# Patient Record
Sex: MALE | Race: WHITE | Employment: PART TIME | URBAN - METROPOLITAN AREA
[De-identification: names, ages, dates, MRNs, and addresses within clinical notes are randomized per-mention and may not be internally consistent; named-entity substitution may affect disease eponyms.]

---

## 2019-07-01 ENCOUNTER — HOSPITAL ENCOUNTER (OUTPATIENT)
Dept: PREADMISSION TESTING | Age: 79
Discharge: HOME | End: 2019-07-01
Payer: MEDICARE

## 2019-07-01 DIAGNOSIS — M17.11 OSTEOARTHRITIS OF RIGHT KNEE: ICD-10-CM

## 2019-07-01 DIAGNOSIS — Z01.818 OTHER SPECIFIED PRE-OPERATIVE EXAMINATION: ICD-10-CM

## 2019-07-01 LAB
ALBUMIN SERPL-MCNC: 3.5 G/DL (ref 3.4–5)
ALBUMIN/GLOB SERPL: 1 {RATIO} (ref 0.8–1.7)
ALP SERPL-CCNC: 71 U/L (ref 45–117)
ALT SERPL-CCNC: 22 U/L (ref 16–61)
ANION GAP SERPL CALC-SCNC: 4 MMOL/L (ref 3–18)
APPEARANCE UR: CLEAR
APTT PPP: 32.6 SEC (ref 23–36.4)
AST SERPL-CCNC: 24 U/L (ref 15–37)
ATRIAL RATE: 67 BPM
BACTERIA SPEC CULT: NORMAL
BASOPHILS # BLD: 0 K/UL (ref 0–0.1)
BASOPHILS NFR BLD: 0 % (ref 0–2)
BILIRUB SERPL-MCNC: 0.6 MG/DL (ref 0.2–1)
BILIRUB UR QL: NEGATIVE
BUN SERPL-MCNC: 26 MG/DL (ref 7–18)
BUN/CREAT SERPL: 22 (ref 12–20)
CALCIUM SERPL-MCNC: 9.1 MG/DL (ref 8.5–10.1)
CALCULATED R AXIS, ECG10: -62 DEGREES
CALCULATED T AXIS, ECG11: -13 DEGREES
CHLORIDE SERPL-SCNC: 106 MMOL/L (ref 100–108)
CO2 SERPL-SCNC: 30 MMOL/L (ref 21–32)
COLOR UR: YELLOW
CREAT SERPL-MCNC: 1.17 MG/DL (ref 0.6–1.3)
CRP SERPL-MCNC: 0.6 MG/DL (ref 0–0.3)
DIAGNOSIS, 93000: NORMAL
DIFFERENTIAL METHOD BLD: ABNORMAL
EOSINOPHIL # BLD: 0 K/UL (ref 0–0.4)
EOSINOPHIL NFR BLD: 1 % (ref 0–5)
ERYTHROCYTE [DISTWIDTH] IN BLOOD BY AUTOMATED COUNT: 12.8 % (ref 11.6–14.5)
ERYTHROCYTE [SEDIMENTATION RATE] IN BLOOD: 23 MM/HR (ref 0–20)
EST. AVERAGE GLUCOSE BLD GHB EST-MCNC: 134 MG/DL
GLOBULIN SER CALC-MCNC: 3.6 G/DL (ref 2–4)
GLUCOSE SERPL-MCNC: 77 MG/DL (ref 74–99)
GLUCOSE UR STRIP.AUTO-MCNC: NEGATIVE MG/DL
HBA1C MFR BLD: 6.3 % (ref 4.2–5.6)
HCT VFR BLD AUTO: 42.5 % (ref 36–48)
HGB BLD-MCNC: 14.5 G/DL (ref 13–16)
HGB UR QL STRIP: NEGATIVE
INR PPP: 2 (ref 0.8–1.2)
KETONES UR QL STRIP.AUTO: NEGATIVE MG/DL
LEUKOCYTE ESTERASE UR QL STRIP.AUTO: NEGATIVE
LYMPHOCYTES # BLD: 1.6 K/UL (ref 0.9–3.6)
LYMPHOCYTES NFR BLD: 26 % (ref 21–52)
MCH RBC QN AUTO: 32.2 PG (ref 24–34)
MCHC RBC AUTO-ENTMCNC: 34.1 G/DL (ref 31–37)
MCV RBC AUTO: 94.4 FL (ref 74–97)
MONOCYTES # BLD: 0.7 K/UL (ref 0.05–1.2)
MONOCYTES NFR BLD: 11 % (ref 3–10)
NEUTS SEG # BLD: 3.8 K/UL (ref 1.8–8)
NEUTS SEG NFR BLD: 62 % (ref 40–73)
NITRITE UR QL STRIP.AUTO: NEGATIVE
PH UR STRIP: 6.5 [PH] (ref 5–8)
PLATELET # BLD AUTO: 235 K/UL (ref 135–420)
PMV BLD AUTO: 9.3 FL (ref 9.2–11.8)
POTASSIUM SERPL-SCNC: 4.3 MMOL/L (ref 3.5–5.5)
PROT SERPL-MCNC: 7.1 G/DL (ref 6.4–8.2)
PROT UR STRIP-MCNC: NEGATIVE MG/DL
PROTHROMBIN TIME: 22.3 SEC (ref 11.5–15.2)
Q-T INTERVAL, ECG07: 396 MS
QRS DURATION, ECG06: 92 MS
QTC CALCULATION (BEZET), ECG08: 411 MS
RBC # BLD AUTO: 4.5 M/UL (ref 4.7–5.5)
SERVICE CMNT-IMP: NORMAL
SODIUM SERPL-SCNC: 140 MMOL/L (ref 136–145)
SP GR UR REFRACTOMETRY: 1.02 (ref 1–1.03)
UROBILINOGEN UR QL STRIP.AUTO: 1 EU/DL (ref 0.2–1)
VENTRICULAR RATE, ECG03: 65 BPM
WBC # BLD AUTO: 6.1 K/UL (ref 4.6–13.2)

## 2019-07-01 PROCEDURE — 80053 COMPREHEN METABOLIC PANEL: CPT

## 2019-07-01 PROCEDURE — 83036 HEMOGLOBIN GLYCOSYLATED A1C: CPT

## 2019-07-01 PROCEDURE — 36415 COLL VENOUS BLD VENIPUNCTURE: CPT

## 2019-07-01 PROCEDURE — 85610 PROTHROMBIN TIME: CPT

## 2019-07-01 PROCEDURE — 85652 RBC SED RATE AUTOMATED: CPT

## 2019-07-01 PROCEDURE — 85730 THROMBOPLASTIN TIME PARTIAL: CPT

## 2019-07-01 PROCEDURE — 86140 C-REACTIVE PROTEIN: CPT

## 2019-07-01 PROCEDURE — 87641 MR-STAPH DNA AMP PROBE: CPT

## 2019-07-01 PROCEDURE — 85025 COMPLETE CBC W/AUTO DIFF WBC: CPT

## 2019-07-01 PROCEDURE — 87086 URINE CULTURE/COLONY COUNT: CPT

## 2019-07-01 PROCEDURE — 81003 URINALYSIS AUTO W/O SCOPE: CPT

## 2019-07-01 PROCEDURE — 93005 ELECTROCARDIOGRAM TRACING: CPT

## 2019-07-03 LAB
BACTERIA SPEC CULT: NORMAL
SERVICE CMNT-IMP: NORMAL

## 2019-07-16 ENCOUNTER — ANESTHESIA EVENT (OUTPATIENT)
Dept: SURGERY | Age: 79
DRG: 470 | End: 2019-07-16
Payer: MEDICARE

## 2019-07-16 NOTE — H&P
[facility] [facility]  History and Physical Exam    Patient: Odalys Ball MRN: [id number]  SSN: [id number]    YOB: 1940  Age: [de-identified] y.o. Sex: male      Subjective:      Chief Complaint: right knee pain    History of Present Illness:  Patient complains of knee pain on the affected side and difficulty ambulating. Pain is increased with increasing activity. Pain is increased with weight bearing. Pain interferes with activities of daily living. Patient has noticed decreased range of motion. Admitting patient as inpatient acknowledging increased risk of anesthesia post-op monitoring due to history of Afib, GERD and hypertension. Past Medical History:   Diagnosis Date    Arrhythmia 2017    A-fib    Arthritis     Cancer ([address]     skin cancers    GERD (gastroesophageal reflux disease)     Hypertension     Ill-defined condition     Very sensitive skin, scaly and itchy with many types of chemicals      Past Surgical History:   Procedure Laterality Date    HX APPENDECTOMY  35years old    HX OTHER SURGICAL  multiple years    8-10 skin cancers removed in 's office    HX SHOULDER ARTHROSCOPY Right 2003    shoulder torn tendon    HX UROLOGICAL  many years ago    hydrocele     Social History     Occupational History    Not on file   Tobacco Use    Smoking status: Never Smoker    Smokeless tobacco: Never Used   Substance and Sexual Activity    Alcohol use: Never     Frequency: Never    Drug use: Never    Sexual activity: Not on file     Prior to Admission medications    Medication Sig Start Date End Date Taking?  Authorizing Provider   lisinopril (PRINIVIL, ZESTRIL) 10 mg tablet Take 10 mg by mouth. 6/20/19 12/17/19  Provider, Historical   hydroCHLOROthiazide (HYDRODIURIL) 25 mg tablet Take 25 mg by mouth. 6/20/19   Provider, Historical   omeprazole (PRILOSEC) 20 mg capsule Take 20 mg by mouth. 7/20/18 7/20/19  Provider, Historical   warfarin (COUMADIN) 5 mg tablet Take 7.5 mg by mouth every Monday. Provider, Historical   acetaminophen (TYLENOL ARTHRITIS PAIN) 650 mg TbER Take 650 mg by mouth as needed. Provider, Historical   fish oil-omega-3 fatty acids 340-1,000 mg capsule Take 1 Cap by mouth daily. Provider, Historical   warfarin (COUMADIN) 5 mg tablet Take 5 mg by mouth six (6) days a week. Provider, Historical       Family History Non-contributory. Allergies: Allergies   Allergen Reactions    Penicillins Other (comments)     Severe migraine headache        Review of Systems:  A comprehensive review of systems was negative. Objective:       Physical Exam:  General:  Alert, oriented, pleasant, well-groomed  HEENT:  Normocephalic, atraumatic  Lungs:   Clear to auscultation  Heart:    Regular rate and rhythm  Abdomen:  Soft, non-tender  Extremities:   Pain with range of motion of the affected knee    ROM: decreased    Crepitus with motion of affected knee    Mild effusion       Xrays:   Xrays demonstrate severe arthritic changes including sunchondral cysts, subchondral sclerosis, periarticular osteophytes, and joint space narrowing. Assessment:      Arthritis of the affected knee. This has significantly affected the patient's quality of life. It interferes with activities of daily living. It is worse with weight bearing and activity. Plan:       Proceed with scheduled right total knee. The patient has failed conservative measures including anti-inflammatories, injections, activity modification, and a trial of physical therapy or external joint support which includes a home exercise program.    The various methods of treatment have been discussed with the patient and family. After consideration of risks, benefits, and other options for treatment, the patient has consented to surgical interventions. Questions were answered and preoperative teaching was done by Dr. Gin Wilson.      Signed By: Lynn Tom PA-C     July 16, 2019

## 2019-07-17 ENCOUNTER — APPOINTMENT (OUTPATIENT)
Dept: GENERAL RADIOLOGY | Age: 79
DRG: 470 | End: 2019-07-17
Attending: PHYSICIAN ASSISTANT
Payer: MEDICARE

## 2019-07-17 ENCOUNTER — HOSPITAL ENCOUNTER (INPATIENT)
Age: 79
LOS: 1 days | Discharge: HOME HEALTH | DRG: 470 | End: 2019-07-18
Attending: ORTHOPAEDIC SURGERY | Admitting: ORTHOPAEDIC SURGERY
Payer: MEDICARE

## 2019-07-17 ENCOUNTER — ANESTHESIA (OUTPATIENT)
Dept: SURGERY | Age: 79
DRG: 470 | End: 2019-07-17
Payer: MEDICARE

## 2019-07-17 DIAGNOSIS — M17.11 PRIMARY OSTEOARTHRITIS OF RIGHT KNEE: Primary | ICD-10-CM

## 2019-07-17 LAB
ABO + RH BLD: NORMAL
BLOOD GROUP ANTIBODIES SERPL: NORMAL
INR PPP: 1.1 (ref 0.8–1.2)
PROTHROMBIN TIME: 13.7 SEC (ref 11.5–15.2)
SPECIMEN EXP DATE BLD: NORMAL

## 2019-07-17 PROCEDURE — 74011250636 HC RX REV CODE- 250/636: Performed by: ORTHOPAEDIC SURGERY

## 2019-07-17 PROCEDURE — 76210000016 HC OR PH I REC 1 TO 1.5 HR: Performed by: ORTHOPAEDIC SURGERY

## 2019-07-17 PROCEDURE — 77030039267 HC ADH SKN EXOFIN S2SG -B: Performed by: ORTHOPAEDIC SURGERY

## 2019-07-17 PROCEDURE — 85610 PROTHROMBIN TIME: CPT

## 2019-07-17 PROCEDURE — 86900 BLOOD TYPING SEROLOGIC ABO: CPT

## 2019-07-17 PROCEDURE — 74011250636 HC RX REV CODE- 250/636: Performed by: PHYSICIAN ASSISTANT

## 2019-07-17 PROCEDURE — 77030031139 HC SUT VCRL2 J&J -A: Performed by: ORTHOPAEDIC SURGERY

## 2019-07-17 PROCEDURE — 77030027138 HC INCENT SPIROMETER -A: Performed by: ORTHOPAEDIC SURGERY

## 2019-07-17 PROCEDURE — 64450 NJX AA&/STRD OTHER PN/BRANCH: CPT

## 2019-07-17 PROCEDURE — 74011250636 HC RX REV CODE- 250/636: Performed by: ANESTHESIOLOGY

## 2019-07-17 PROCEDURE — 77030018835 HC SOL IRR LR ICUM -A: Performed by: ORTHOPAEDIC SURGERY

## 2019-07-17 PROCEDURE — C1776 JOINT DEVICE (IMPLANTABLE): HCPCS | Performed by: ORTHOPAEDIC SURGERY

## 2019-07-17 PROCEDURE — 77030018846 HC SOL IRR STRL H20 ICUM -A: Performed by: ORTHOPAEDIC SURGERY

## 2019-07-17 PROCEDURE — 77030018836 HC SOL IRR NACL ICUM -A: Performed by: ORTHOPAEDIC SURGERY

## 2019-07-17 PROCEDURE — 76942 ECHO GUIDE FOR BIOPSY: CPT | Performed by: ORTHOPAEDIC SURGERY

## 2019-07-17 PROCEDURE — 77030036563 HC WRP CLD THER KNE S2SG -B: Performed by: ORTHOPAEDIC SURGERY

## 2019-07-17 PROCEDURE — 0SRC0JA REPLACEMENT OF RIGHT KNEE JOINT WITH SYNTHETIC SUBSTITUTE, UNCEMENTED, OPEN APPROACH: ICD-10-PCS | Performed by: ORTHOPAEDIC SURGERY

## 2019-07-17 PROCEDURE — 74011000258 HC RX REV CODE- 258: Performed by: PHYSICIAN ASSISTANT

## 2019-07-17 PROCEDURE — 74011250637 HC RX REV CODE- 250/637: Performed by: ANESTHESIOLOGY

## 2019-07-17 PROCEDURE — 97161 PT EVAL LOW COMPLEX 20 MIN: CPT

## 2019-07-17 PROCEDURE — 74011250636 HC RX REV CODE- 250/636

## 2019-07-17 PROCEDURE — 77010033678 HC OXYGEN DAILY

## 2019-07-17 PROCEDURE — 74011000250 HC RX REV CODE- 250

## 2019-07-17 PROCEDURE — 77030000032 HC CUF TRNQT ZIMM -B: Performed by: ORTHOPAEDIC SURGERY

## 2019-07-17 PROCEDURE — 3E0T3BZ INTRODUCTION OF ANESTHETIC AGENT INTO PERIPHERAL NERVES AND PLEXI, PERCUTANEOUS APPROACH: ICD-10-PCS | Performed by: ANESTHESIOLOGY

## 2019-07-17 PROCEDURE — 77030002933 HC SUT MCRYL J&J -A: Performed by: ORTHOPAEDIC SURGERY

## 2019-07-17 PROCEDURE — 77030037875 HC DRSG MEPILEX <16IN BORD MOLN -A: Performed by: ORTHOPAEDIC SURGERY

## 2019-07-17 PROCEDURE — 97116 GAIT TRAINING THERAPY: CPT

## 2019-07-17 PROCEDURE — 36415 COLL VENOUS BLD VENIPUNCTURE: CPT

## 2019-07-17 PROCEDURE — 77030035643 HC BLD SAW OSC PRECIS STRY -C: Performed by: ORTHOPAEDIC SURGERY

## 2019-07-17 PROCEDURE — C9290 INJ, BUPIVACAINE LIPOSOME: HCPCS | Performed by: ORTHOPAEDIC SURGERY

## 2019-07-17 PROCEDURE — 74011000250 HC RX REV CODE- 250: Performed by: ORTHOPAEDIC SURGERY

## 2019-07-17 PROCEDURE — 77030020782 HC GWN BAIR PAWS FLX 3M -B: Performed by: ORTHOPAEDIC SURGERY

## 2019-07-17 PROCEDURE — 65270000029 HC RM PRIVATE

## 2019-07-17 PROCEDURE — 73560 X-RAY EXAM OF KNEE 1 OR 2: CPT

## 2019-07-17 PROCEDURE — 77030012508 HC MSK AIRWY LMA AMBU -A: Performed by: ANESTHESIOLOGY

## 2019-07-17 PROCEDURE — 76010000131 HC OR TIME 2 TO 2.5 HR: Performed by: ORTHOPAEDIC SURGERY

## 2019-07-17 PROCEDURE — 77030013708 HC HNDPC SUC IRR PULS STRY –B: Performed by: ORTHOPAEDIC SURGERY

## 2019-07-17 PROCEDURE — C1713 ANCHOR/SCREW BN/BN,TIS/BN: HCPCS | Performed by: ORTHOPAEDIC SURGERY

## 2019-07-17 PROCEDURE — 74011000258 HC RX REV CODE- 258: Performed by: ORTHOPAEDIC SURGERY

## 2019-07-17 PROCEDURE — 76060000035 HC ANESTHESIA 2 TO 2.5 HR: Performed by: ORTHOPAEDIC SURGERY

## 2019-07-17 PROCEDURE — 74011000250 HC RX REV CODE- 250: Performed by: PHYSICIAN ASSISTANT

## 2019-07-17 PROCEDURE — 74011000258 HC RX REV CODE- 258

## 2019-07-17 PROCEDURE — 77030040361 HC SLV COMPR DVT MDII -B: Performed by: ORTHOPAEDIC SURGERY

## 2019-07-17 PROCEDURE — 74011250637 HC RX REV CODE- 250/637: Performed by: PHYSICIAN ASSISTANT

## 2019-07-17 DEVICE — COMPONENT PAT SZ A40 W44MM DIA40MM THK11MM MED LAT KNEE: Type: IMPLANTABLE DEVICE | Site: KNEE | Status: FUNCTIONAL

## 2019-07-17 DEVICE — IMPLANTABLE DEVICE: Type: IMPLANTABLE DEVICE | Site: KNEE | Status: FUNCTIONAL

## 2019-07-17 DEVICE — TIBIAL COMPONENT
Type: IMPLANTABLE DEVICE | Site: KNEE | Status: FUNCTIONAL
Brand: TRIATHLON

## 2019-07-17 DEVICE — COMPONENT TOT KNEE HYBRID POROUS X3 TRIATHLON: Type: IMPLANTABLE DEVICE | Site: KNEE | Status: FUNCTIONAL

## 2019-07-17 RX ORDER — FENTANYL CITRATE 50 UG/ML
25 INJECTION, SOLUTION INTRAMUSCULAR; INTRAVENOUS AS NEEDED
Status: DISCONTINUED | OUTPATIENT
Start: 2019-07-17 | End: 2019-07-17 | Stop reason: HOSPADM

## 2019-07-17 RX ORDER — PROPOFOL 10 MG/ML
INJECTION, EMULSION INTRAVENOUS AS NEEDED
Status: DISCONTINUED | OUTPATIENT
Start: 2019-07-17 | End: 2019-07-17 | Stop reason: HOSPADM

## 2019-07-17 RX ORDER — SODIUM CHLORIDE, SODIUM LACTATE, POTASSIUM CHLORIDE, CALCIUM CHLORIDE 600; 310; 30; 20 MG/100ML; MG/100ML; MG/100ML; MG/100ML
125 INJECTION, SOLUTION INTRAVENOUS CONTINUOUS
Status: DISCONTINUED | OUTPATIENT
Start: 2019-07-17 | End: 2019-07-18 | Stop reason: HOSPADM

## 2019-07-17 RX ORDER — CELECOXIB 100 MG/1
400 CAPSULE ORAL
Status: COMPLETED | OUTPATIENT
Start: 2019-07-17 | End: 2019-07-17

## 2019-07-17 RX ORDER — SODIUM CHLORIDE 0.9 % (FLUSH) 0.9 %
5-40 SYRINGE (ML) INJECTION EVERY 8 HOURS
Status: DISCONTINUED | OUTPATIENT
Start: 2019-07-17 | End: 2019-07-18 | Stop reason: HOSPADM

## 2019-07-17 RX ORDER — ONDANSETRON 2 MG/ML
4 INJECTION INTRAMUSCULAR; INTRAVENOUS
Status: DISCONTINUED | OUTPATIENT
Start: 2019-07-17 | End: 2019-07-18 | Stop reason: HOSPADM

## 2019-07-17 RX ORDER — EPHEDRINE SULFATE/0.9% NACL/PF 25 MG/5 ML
SYRINGE (ML) INTRAVENOUS AS NEEDED
Status: DISCONTINUED | OUTPATIENT
Start: 2019-07-17 | End: 2019-07-17 | Stop reason: HOSPADM

## 2019-07-17 RX ORDER — CEFAZOLIN SODIUM/WATER 2 G/20 ML
2 SYRINGE (ML) INTRAVENOUS
Status: COMPLETED | OUTPATIENT
Start: 2019-07-17 | End: 2019-07-17

## 2019-07-17 RX ORDER — KETAMINE HYDROCHLORIDE 10 MG/ML
INJECTION, SOLUTION INTRAMUSCULAR; INTRAVENOUS AS NEEDED
Status: DISCONTINUED | OUTPATIENT
Start: 2019-07-17 | End: 2019-07-17 | Stop reason: HOSPADM

## 2019-07-17 RX ORDER — ENOXAPARIN SODIUM 100 MG/ML
40 INJECTION SUBCUTANEOUS DAILY
Status: DISCONTINUED | OUTPATIENT
Start: 2019-07-18 | End: 2019-07-18 | Stop reason: HOSPADM

## 2019-07-17 RX ORDER — SODIUM CHLORIDE 0.9 % (FLUSH) 0.9 %
5-40 SYRINGE (ML) INJECTION AS NEEDED
Status: DISCONTINUED | OUTPATIENT
Start: 2019-07-17 | End: 2019-07-18 | Stop reason: HOSPADM

## 2019-07-17 RX ORDER — HYDROCHLOROTHIAZIDE 25 MG/1
25 TABLET ORAL DAILY
Status: DISCONTINUED | OUTPATIENT
Start: 2019-07-18 | End: 2019-07-18 | Stop reason: HOSPADM

## 2019-07-17 RX ORDER — FLUMAZENIL 0.1 MG/ML
0.2 INJECTION INTRAVENOUS
Status: DISCONTINUED | OUTPATIENT
Start: 2019-07-17 | End: 2019-07-17 | Stop reason: HOSPADM

## 2019-07-17 RX ORDER — ONDANSETRON 2 MG/ML
INJECTION INTRAMUSCULAR; INTRAVENOUS AS NEEDED
Status: DISCONTINUED | OUTPATIENT
Start: 2019-07-17 | End: 2019-07-17 | Stop reason: HOSPADM

## 2019-07-17 RX ORDER — HYDROMORPHONE HYDROCHLORIDE 1 MG/ML
0.5 INJECTION, SOLUTION INTRAMUSCULAR; INTRAVENOUS; SUBCUTANEOUS
Status: DISCONTINUED | OUTPATIENT
Start: 2019-07-17 | End: 2019-07-18 | Stop reason: HOSPADM

## 2019-07-17 RX ORDER — SODIUM CHLORIDE, SODIUM LACTATE, POTASSIUM CHLORIDE, CALCIUM CHLORIDE 600; 310; 30; 20 MG/100ML; MG/100ML; MG/100ML; MG/100ML
1000 INJECTION, SOLUTION INTRAVENOUS CONTINUOUS
Status: DISCONTINUED | OUTPATIENT
Start: 2019-07-17 | End: 2019-07-17 | Stop reason: HOSPADM

## 2019-07-17 RX ORDER — SODIUM CHLORIDE, SODIUM LACTATE, POTASSIUM CHLORIDE, CALCIUM CHLORIDE 600; 310; 30; 20 MG/100ML; MG/100ML; MG/100ML; MG/100ML
300 INJECTION, SOLUTION INTRAVENOUS CONTINUOUS
Status: DISCONTINUED | OUTPATIENT
Start: 2019-07-17 | End: 2019-07-18 | Stop reason: HOSPADM

## 2019-07-17 RX ORDER — OXYCODONE AND ACETAMINOPHEN 5; 325 MG/1; MG/1
1-2 TABLET ORAL
Status: DISCONTINUED | OUTPATIENT
Start: 2019-07-17 | End: 2019-07-18 | Stop reason: HOSPADM

## 2019-07-17 RX ORDER — OXYCODONE AND ACETAMINOPHEN 5; 325 MG/1; MG/1
1 TABLET ORAL AS NEEDED
Status: DISCONTINUED | OUTPATIENT
Start: 2019-07-17 | End: 2019-07-17 | Stop reason: HOSPADM

## 2019-07-17 RX ORDER — HYDROMORPHONE HYDROCHLORIDE 2 MG/ML
INJECTION, SOLUTION INTRAMUSCULAR; INTRAVENOUS; SUBCUTANEOUS AS NEEDED
Status: DISCONTINUED | OUTPATIENT
Start: 2019-07-17 | End: 2019-07-17 | Stop reason: HOSPADM

## 2019-07-17 RX ORDER — LISINOPRIL 5 MG/1
10 TABLET ORAL DAILY
Status: DISCONTINUED | OUTPATIENT
Start: 2019-07-18 | End: 2019-07-18 | Stop reason: HOSPADM

## 2019-07-17 RX ORDER — MIDAZOLAM HYDROCHLORIDE 1 MG/ML
INJECTION, SOLUTION INTRAMUSCULAR; INTRAVENOUS AS NEEDED
Status: DISCONTINUED | OUTPATIENT
Start: 2019-07-17 | End: 2019-07-17 | Stop reason: HOSPADM

## 2019-07-17 RX ORDER — DIPHENHYDRAMINE HYDROCHLORIDE 50 MG/ML
12.5 INJECTION, SOLUTION INTRAMUSCULAR; INTRAVENOUS
Status: DISCONTINUED | OUTPATIENT
Start: 2019-07-17 | End: 2019-07-18 | Stop reason: HOSPADM

## 2019-07-17 RX ORDER — OMEPRAZOLE 20 MG/1
20 CAPSULE, DELAYED RELEASE ORAL DAILY
Status: DISCONTINUED | OUTPATIENT
Start: 2019-07-18 | End: 2019-07-18 | Stop reason: HOSPADM

## 2019-07-17 RX ORDER — ACETAMINOPHEN 500 MG
1000 TABLET ORAL
Status: COMPLETED | OUTPATIENT
Start: 2019-07-17 | End: 2019-07-17

## 2019-07-17 RX ORDER — NALOXONE HYDROCHLORIDE 0.4 MG/ML
0.4 INJECTION, SOLUTION INTRAMUSCULAR; INTRAVENOUS; SUBCUTANEOUS AS NEEDED
Status: DISCONTINUED | OUTPATIENT
Start: 2019-07-17 | End: 2019-07-18 | Stop reason: HOSPADM

## 2019-07-17 RX ORDER — HYDROMORPHONE HYDROCHLORIDE 2 MG/ML
0.5 INJECTION, SOLUTION INTRAMUSCULAR; INTRAVENOUS; SUBCUTANEOUS
Status: DISCONTINUED | OUTPATIENT
Start: 2019-07-17 | End: 2019-07-17 | Stop reason: HOSPADM

## 2019-07-17 RX ORDER — ROPIVACAINE HYDROCHLORIDE 5 MG/ML
INJECTION, SOLUTION EPIDURAL; INFILTRATION; PERINEURAL AS NEEDED
Status: DISCONTINUED | OUTPATIENT
Start: 2019-07-17 | End: 2019-07-17 | Stop reason: HOSPADM

## 2019-07-17 RX ORDER — MELOXICAM 7.5 MG/1
15 TABLET ORAL DAILY
Status: DISCONTINUED | OUTPATIENT
Start: 2019-07-18 | End: 2019-07-18 | Stop reason: HOSPADM

## 2019-07-17 RX ORDER — CEFAZOLIN SODIUM/WATER 2 G/20 ML
2 SYRINGE (ML) INTRAVENOUS EVERY 8 HOURS
Status: COMPLETED | OUTPATIENT
Start: 2019-07-17 | End: 2019-07-18

## 2019-07-17 RX ORDER — SENNOSIDES 8.6 MG/1
1 TABLET ORAL 2 TIMES DAILY
Status: DISCONTINUED | OUTPATIENT
Start: 2019-07-17 | End: 2019-07-18 | Stop reason: HOSPADM

## 2019-07-17 RX ORDER — ALBUTEROL SULFATE 0.83 MG/ML
2.5 SOLUTION RESPIRATORY (INHALATION) AS NEEDED
Status: DISCONTINUED | OUTPATIENT
Start: 2019-07-17 | End: 2019-07-17 | Stop reason: HOSPADM

## 2019-07-17 RX ORDER — NALOXONE HYDROCHLORIDE 0.4 MG/ML
0.2 INJECTION, SOLUTION INTRAMUSCULAR; INTRAVENOUS; SUBCUTANEOUS AS NEEDED
Status: DISCONTINUED | OUTPATIENT
Start: 2019-07-17 | End: 2019-07-17 | Stop reason: HOSPADM

## 2019-07-17 RX ORDER — WARFARIN SODIUM 5 MG/1
5 TABLET ORAL ONCE
Status: COMPLETED | OUTPATIENT
Start: 2019-07-17 | End: 2019-07-17

## 2019-07-17 RX ORDER — DIPHENHYDRAMINE HYDROCHLORIDE 50 MG/ML
12.5 INJECTION, SOLUTION INTRAMUSCULAR; INTRAVENOUS
Status: DISCONTINUED | OUTPATIENT
Start: 2019-07-17 | End: 2019-07-17 | Stop reason: HOSPADM

## 2019-07-17 RX ORDER — LIDOCAINE HYDROCHLORIDE 20 MG/ML
INJECTION, SOLUTION EPIDURAL; INFILTRATION; INTRACAUDAL; PERINEURAL AS NEEDED
Status: DISCONTINUED | OUTPATIENT
Start: 2019-07-17 | End: 2019-07-17 | Stop reason: HOSPADM

## 2019-07-17 RX ADMIN — TRANEXAMIC ACID 1 G: 100 INJECTION, SOLUTION INTRAVENOUS at 10:49

## 2019-07-17 RX ADMIN — ONDANSETRON 4 MG: 2 INJECTION INTRAMUSCULAR; INTRAVENOUS at 10:49

## 2019-07-17 RX ADMIN — SODIUM CHLORIDE, SODIUM LACTATE, POTASSIUM CHLORIDE, AND CALCIUM CHLORIDE: 600; 310; 30; 20 INJECTION, SOLUTION INTRAVENOUS at 10:40

## 2019-07-17 RX ADMIN — ACETAMINOPHEN 1000 MG: 500 TABLET ORAL at 08:55

## 2019-07-17 RX ADMIN — Medication 2 G: at 10:42

## 2019-07-17 RX ADMIN — KETAMINE HYDROCHLORIDE 50 MG: 10 INJECTION, SOLUTION INTRAMUSCULAR; INTRAVENOUS at 10:47

## 2019-07-17 RX ADMIN — SODIUM CHLORIDE, SODIUM LACTATE, POTASSIUM CHLORIDE, AND CALCIUM CHLORIDE: 600; 310; 30; 20 INJECTION, SOLUTION INTRAVENOUS at 12:34

## 2019-07-17 RX ADMIN — LIDOCAINE HYDROCHLORIDE 80 MG: 20 INJECTION, SOLUTION EPIDURAL; INFILTRATION; INTRACAUDAL; PERINEURAL at 10:47

## 2019-07-17 RX ADMIN — Medication 5 MG: at 11:16

## 2019-07-17 RX ADMIN — SODIUM CHLORIDE, SODIUM LACTATE, POTASSIUM CHLORIDE, AND CALCIUM CHLORIDE 125 ML/HR: 600; 310; 30; 20 INJECTION, SOLUTION INTRAVENOUS at 08:41

## 2019-07-17 RX ADMIN — MIDAZOLAM HYDROCHLORIDE 1 MG: 1 INJECTION, SOLUTION INTRAMUSCULAR; INTRAVENOUS at 10:11

## 2019-07-17 RX ADMIN — Medication 8.6 MG: at 21:45

## 2019-07-17 RX ADMIN — SODIUM CHLORIDE, SODIUM LACTATE, POTASSIUM CHLORIDE, AND CALCIUM CHLORIDE 300 ML/HR: 600; 310; 30; 20 INJECTION, SOLUTION INTRAVENOUS at 15:03

## 2019-07-17 RX ADMIN — ROPIVACAINE HYDROCHLORIDE 10 ML: 5 INJECTION, SOLUTION EPIDURAL; INFILTRATION; PERINEURAL at 10:11

## 2019-07-17 RX ADMIN — CELECOXIB 400 MG: 100 CAPSULE ORAL at 08:55

## 2019-07-17 RX ADMIN — ROPIVACAINE HYDROCHLORIDE 10 ML: 5 INJECTION, SOLUTION EPIDURAL; INFILTRATION; PERINEURAL at 10:13

## 2019-07-17 RX ADMIN — Medication 2 G: at 17:27

## 2019-07-17 RX ADMIN — HYDROMORPHONE HYDROCHLORIDE 1 MG: 2 INJECTION, SOLUTION INTRAMUSCULAR; INTRAVENOUS; SUBCUTANEOUS at 10:47

## 2019-07-17 RX ADMIN — PROPOFOL 200 MG: 10 INJECTION, EMULSION INTRAVENOUS at 10:47

## 2019-07-17 RX ADMIN — WARFARIN SODIUM 5 MG: 5 TABLET ORAL at 17:24

## 2019-07-17 RX ADMIN — MIDAZOLAM HYDROCHLORIDE 1 MG: 1 INJECTION, SOLUTION INTRAMUSCULAR; INTRAVENOUS at 10:09

## 2019-07-17 RX ADMIN — ROPIVACAINE HYDROCHLORIDE 10 ML: 5 INJECTION, SOLUTION EPIDURAL; INFILTRATION; PERINEURAL at 10:12

## 2019-07-17 RX ADMIN — TRANEXAMIC ACID 1 G: 100 INJECTION, SOLUTION INTRAVENOUS at 11:43

## 2019-07-17 RX ADMIN — Medication 5 MG: at 11:18

## 2019-07-17 RX ADMIN — FENTANYL CITRATE 25 MCG: 50 INJECTION, SOLUTION INTRAMUSCULAR; INTRAVENOUS at 13:46

## 2019-07-17 NOTE — PROGRESS NOTES
Pharmacy Dosing Services: Warfarin     Consult for Warfarin Dosing by Pharmacy by Merlin Stewart Pa-C  Consult provided for this [de-identified] y.o.  male , for indication of DVT prophylaxis from Total Knee Replacement / Atrial Fibrillation. Day of Therapy (continuation from home)   Dose to achieve an INR goal of 2-3  Home dose Warfarin 5 mg six days a week                                    7.5 mg every Monday   Order entered for Warfarin 5 mg to be given today at 18:00. Lovenox bridge    Significant drug interactions: Meloxicam / Lovenox   PT/INR Lab Results   Component Value Date/Time    INR 1.1 07/17/2019 08:30 AM        Platelets Lab Results   Component Value Date/Time    PLATELET 125 65/85/1543 11:41 AM        H/H     Lab Results   Component Value Date/Time    HGB 14.5 07/01/2019 11:41 AM          Warfarin Administrations (last 168 hours)       None          Pharmacy to follow daily and will provide subsequent Warfarin dosing based on clinical status.   Dean Schul[facility] [place])  Contact information  [phone]

## 2019-07-17 NOTE — PERIOP NOTES
present in pre-op to assess patient's right knee abrasion. After assessment  states that he is ok to proceed with today's procedure.

## 2019-07-17 NOTE — INTERVAL H&P NOTE
H&P Update: Conor Stauffer was seen and examined. History and physical has been reviewed. The patient has been examined. There have been no significant clinical changes since the completion of the originally dated History and Physical.  Patient identified by surgeon; surgical site was confirmed by patient and surgeon.

## 2019-07-17 NOTE — PROGRESS NOTES
[facility] care of pt at this time. Assessment complete. Pt alert and oriented x 4. Denies SOB and chest pain. Pt lungs clear bilaterally. Cap refill  less than 3 seconds. Pt denies numbness and tingling to all extremities. Stated pain 4/10. Pt has 18 IV to right forearm. Pt has ACE bandage dressing to right knee CDI . SCD's to and TEDs applied to LLE and plexis to RLE. Pt encouraged to continue use of IS. Pt verbalized understanding. Ice pack applied. Call light and possessions within reach. Bed in low position. Will continue to monitor. Fall risk arm band in place    1643  Pt ambulating in hallway with PT    Shift summary  Pt is alert and oriented x 4. Pt had uneventful shift. Pt ambulating and voiding sufficient amounts. Pain controlled by PRN medication.

## 2019-07-17 NOTE — PERIOP NOTES
Pt transferred to  216. VSS. Primary Nurse Christie Barcenas RN and QIAN Garcia RN performed a dual skin assessment on this patient No impairment noted.       Visit Vitals  /73 (BP 1 Location: Left arm, BP Patient Position: At rest)   Pulse 84   Temp 97.4 °F (36.3 °C)   Resp 14   Ht 5' 5\" (1.651 m)   Wt 79.4 kg (175 lb)   SpO2 97%   BMI 29.12 kg/m²       Intake/Output Summary (Last 24 hours) at 7/17/2019 1458  Last data filed at 7/17/2019 1440  Gross per 24 hour   Intake 1700 ml   Output 150 ml   Net 1550 ml

## 2019-07-17 NOTE — ANESTHESIA POSTPROCEDURE EVALUATION
Post-Anesthesia Evaluation and Assessment    Cardiovascular Function/Vital Signs  Visit Vitals  /71   Pulse 80   Temp 36.6 °C (97.8 °F)   Resp 15   Ht 5' 5\" (1.651 m)   Wt 79.4 kg (175 lb)   SpO2 94%   BMI 29.12 kg/m²       Patient is status post Procedure(s):  RIGHT TOTAL KNEE ARTHROPLASTY,. Nausea/Vomiting: Controlled. Postoperative hydration reviewed and adequate. Pain:  Pain Scale 1: FLACC (07/17/19 1418)  Pain Intensity 1: 0 (07/17/19 1418)   Managed. Neurological Status:   Neuro (WDL): Exceptions to WDL (07/17/19 1350)   At baseline. Mental Status and Level of Consciousness: Baseline and appropriate for discharge. Pulmonary Status:   O2 Device: Nasal cannula (07/17/19 1350)   Adequate oxygenation and airway patent. Complications related to anesthesia: None    Post-anesthesia assessment completed. No concerns. Patient has met all discharge requirements.     Signed By: Vasquez Vázquez MD    July 17, 2019

## 2019-07-17 NOTE — ROUTINE PROCESS
TRANSFER - IN REPORT:    Verbal report received from Shaheen HENLEY RN(name) on Sara Mantle  being received from [employer]) for routine post - op      Report consisted of patients Situation, Background, Assessment and   Recommendations(SBAR). Information from the following report(s) SBAR, Kardex, [facility] and Recent Results was reviewed with the receiving nurse. Opportunity for questions and clarification was provided. Assessment completed upon patients arrival to unit and care assumed.

## 2019-07-17 NOTE — PERIOP NOTES
Spoke with Dr.Baddar Grady's physician assistant and informed her that the patient last had his Coumadin on 7/11/19. Per [facility] this is ok. [facility] Ancef is the prophylactic antibiotic choice despite the patient's allergy to Penicillin.

## 2019-07-17 NOTE — PERIOP NOTES
TRANSFER - OUT REPORT:    Verbal report given to QIAN Garcia RN (name) on Richmond Delacruz  being transferred to [address] [place] [address] ([facility]) for routine progression of care       Report consisted of patients Situation, Background, Assessment and   Recommendations(SBAR). Information from the following report(s) SBAR, Kardex, Procedure Summary and MAR was reviewed with the receiving nurse. Lines:   Peripheral IV 07/17/19 Right; Inner Forearm (Active)   Site Assessment Clean, dry, & intact 7/17/2019  1:50 PM   Phlebitis Assessment 0 7/17/2019  1:50 PM   Infiltration Assessment 0 7/17/2019  1:50 PM   Dressing Status Clean, dry, & intact 7/17/2019  1:50 PM   Dressing Type Tape;Transparent 7/17/2019  1:50 PM   Hub Color/Line Status Green; Infusing 7/17/2019  1:50 PM   Alcohol Cap Used No 7/17/2019  8:48 AM        Opportunity for questions and clarification was provided.       Patient transported with:   O2 @ 2 liters  Registered Nurse

## 2019-07-17 NOTE — PERIOP NOTES
Reviewed PTA medication list with patient/caregiver and patient/caregiver denies any additional medications. Patient admits to having a responsible adult care for them at home for at least 24 hours after surgery. Patient denies ford chewing/swallowing difficulties. Patient encouraged to use Olesya paws warming system and informed that using Olesya paws to regulate body temperature prior to a procedure has been shown to help reduce the risks of blood clots and infection. Dual skin assessment & fall risk band verification completed with Camelia Santillan RN.

## 2019-07-17 NOTE — PROGRESS NOTES
Problem: Mobility Impaired (Adult and Pediatric)  Goal: *Acute Goals and Plan of Care (Insert Text)  Description  In 1-7 days pt will be able to perform:  ST.  Bed mobility:  Rolling L to R to L modified independent for positioning. 2.  Supine to sit to supine S with HR for meals. 3.  Sit to stand to sit S with RW in prep for ambulation. LT.  Gait:  Ambulate >150ft S with RW, WBAT, for home/community mobility. 2.  Stair Negotiation:  Ascend/descend >3 steps CGA with HR for home entry. 3.  Activity tolerance: Tolerate up in chair 1-2 hours for ADLs. 4.  Patient/Family Education:  Patient/family to be independent with HEP for follow-up care and safe discharge. Note:   PHYSICAL THERAPY EVALUATION    Patient: Tu Gamino ([de-identified] y.o. male)  Date: 2019  Primary Diagnosis: Osteoarthritis of right knee [M17.11]  Procedure(s) (LRB):  RIGHT TOTAL KNEE ARTHROPLASTY, (Right) Day of Surgery   Precautions:   Fall, WBAT    ASSESSMENT :  Based on the objective data described below, the patient presents with decreased functional mobility and independence in regard to bed mobility, transfers, gt quality and tolerance, R knee AROM, R knee strength, pain, balance, activity tolerance, stair negotiation and safety due to recent R TKA surgery. Pt rating pain on numerical pain scale 0/10. Pt required CGA for supine<>sit<>stand. Pt required vc for safe techniques. Pt able to participate in gt training w/ RW, WBAT, GB and CGA in hallway w/ antalgic pattern. Pt c/o mild light headedness throughout session. Pt returned to supine in bed w/ all needs within reach, SCDs applied and ice pack to R knee. Nurse Silvia aware and family present. Recommend [facility] upon hospital d/c. Patient will benefit from skilled intervention to address the above impairments. Patients rehabilitation potential is considered to be Good  Factors which may influence rehabilitation potential include:   ? None noted  ? Mental ability/status  ? Medical condition  ? Home/family situation and support systems  ? Safety awareness  ? Pain tolerance/management  ? Other:      PLAN :  Recommendations and Planned Interventions:  ?           Bed Mobility Training             ? Neuromuscular Re-Education  ? Transfer Training                   ? Orthotic/Prosthetic Training  ? Gait Training                          ? Modalities  ? Therapeutic Exercises          ? Edema Management/Control  ? Therapeutic Activities            ? Patient and Family Training/Education  ? Other (comment):    Frequency/Duration: Patient will be followed by physical therapy twice daily to address goals. Discharge Recommendations: Home Health  Further Equipment Recommendations for Discharge: N/A     SUBJECTIVE:   Patient stated I don't have any pain right now.     OBJECTIVE DATA SUMMARY:     Past Medical History:   Diagnosis Date    Arrhythmia 2017    A-fib    Arthritis     Cancer ([facility] [address]     skin cancers    GERD (gastroesophageal reflux disease)     Hypertension     Ill-defined condition     Very sensitive skin, scaly and itchy with many types of chemicals      Past Surgical History:   Procedure Laterality Date    HX APPENDECTOMY  35years old    HX OTHER SURGICAL  multiple years    8-10 skin cancers removed in 's office    HX SHOULDER ARTHROSCOPY Right 2003    shoulder torn tendon    HX UROLOGICAL  many years ago    hydrocele     Barriers to Learning/Limitations: None  Compensate with: visual, verbal, tactile, kinesthetic cues/model  Prior Level of Function/Home Situation:   Home Situation  Home Environment: Private residence  # Steps to Enter: 3  Rails to Enter: Yes  Hand Rails : Bilateral  One/Two Story Residence: One story  Living Alone: No  Support Systems: Family member(s)  Patient Expects to be Discharged to[de-identified] Private residence  Current DME Used/Available at Home: [facility] Behavior:  Neurologic State: Alert; Appropriate for age  Orientation Level: Oriented X4  Cognition: Appropriate decision making; Appropriate for age attention/concentration; Appropriate safety awareness; Follows commands  Safety/Judgement: Awareness of environment  Psychosocial  Patient Behaviors: Calm; Cooperative  Purposeful Interaction: Yes  Pt Identified Daily Priority: Clinical issues (comment)  Caritas Process: Establish trust;Nurture loving kindness  Skin Condition/Temp: Dry;Warm  Skin Integrity: Incision (comment)(R knee)  Skin Integumentary  Skin Color: Appropriate for ethnicity  Skin Condition/Temp: Dry;Warm  Skin Integrity: Incision (comment)(R knee)  Turgor: Non-tenting  Hair Growth: Present  Varicosities: Absent  Strength:    Strength: Generally decreased, functional  Tone & Sensation:   Tone: Normal  Sensation: Intact  Range Of Motion:  AROM: Generally decreased, functional  Functional Mobility:  Bed Mobility:  Supine to Sit: Contact guard assistance(vc)  Sit to Supine: Stand-by assistance(vc)  Scooting: Stand-by assistance(vc)  Transfers:  Sit to Stand: Contact guard assistance(vc)  Stand to Sit: Contact guard assistance(vc)  Balance:   Sitting: Intact  Standing: Intact; With support  Ambulation/Gait Training:  Distance (ft): 45 Feet (ft)  Assistive Device: Walker, rolling;Gait belt  Ambulation - Level of Assistance: Contact guard assistance(vc)  Gait Abnormalities: Antalgic;Decreased step clearance; Step to gait  Right Side Weight Bearing: As tolerated  Base of Support: Shift to left  Stance: Right decreased  Speed/Rach: Slow  Step Length: Left shortened;Right shortened  Swing Pattern: Left asymmetrical;Right asymmetrical  Interventions: Safety awareness training; Tactile cues; Verbal cues; Visual/Demos  Therapeutic Exercises:   HEP written copy issued to pt per MD protocol.    Pain:  Pain Scale 1: Numeric (0 - 10)  Pain Intensity 1: 0  Pain Location 1: Knee  Pain Orientation 1: Right  Pain Description 1: Aching  Pain Intervention(s) 1: Medication (see MAR)  Activity Tolerance:   Fair   Please refer to the flowsheet for vital signs taken during this treatment. After treatment:   ?         Patient left in no apparent distress sitting up in chair  ? Patient left in no apparent distress in bed  ? Call bell left within reach  ? Nursing notified  ? Family present  ? Bed alarm activated    COMMUNICATION/EDUCATION:   ?         Fall prevention education was provided and the patient/caregiver indicated understanding. ? Patient/family have participated as able in goal setting and plan of care. ?         Patient/family agree to work toward stated goals and plan of care. ?         Patient understands intent and goals of therapy, but is neutral about his/her participation. ? Patient is unable to participate in goal setting and plan of care.     Thank you for this referral.  Max Habermann, PT   Time Calculation: 32 mins       Eval Complexity: History: HIGH Complexity :3+ comorbidities / personal factors will impact the outcome/ POC Exam:MEDIUM Complexity : 3 Standardized tests and measures addressing body structure, function, activity limitation and / or participation in recreation  Presentation: LOW Complexity : Stable, uncomplicated  Clinical Decision Making:Low Complexity    Overall Complexity:LOW

## 2019-07-17 NOTE — ROUTINE PROCESS
Bedside and Verbal shift change report given to Shelli RN (oncoming nurse) by Angela Schreiber RN (offgoing nurse). Report included the following information SBAR, Kardex, MAR and Recent Results.

## 2019-07-18 VITALS
TEMPERATURE: 98.4 F | BODY MASS INDEX: 29.16 KG/M2 | WEIGHT: 175 LBS | HEIGHT: 65 IN | DIASTOLIC BLOOD PRESSURE: 59 MMHG | HEART RATE: 84 BPM | OXYGEN SATURATION: 96 % | RESPIRATION RATE: 16 BRPM | SYSTOLIC BLOOD PRESSURE: 116 MMHG

## 2019-07-18 LAB
ANION GAP SERPL CALC-SCNC: 6 MMOL/L (ref 3–18)
BUN SERPL-MCNC: 23 MG/DL (ref 7–18)
BUN/CREAT SERPL: 25 (ref 12–20)
CALCIUM SERPL-MCNC: 8.4 MG/DL (ref 8.5–10.1)
CHLORIDE SERPL-SCNC: 109 MMOL/L (ref 100–111)
CO2 SERPL-SCNC: 25 MMOL/L (ref 21–32)
CREAT SERPL-MCNC: 0.93 MG/DL (ref 0.6–1.3)
ERYTHROCYTE [DISTWIDTH] IN BLOOD BY AUTOMATED COUNT: 12.6 % (ref 11.6–14.5)
EST. AVERAGE GLUCOSE BLD GHB EST-MCNC: 131 MG/DL
GLUCOSE BLD STRIP.AUTO-MCNC: 189 MG/DL (ref 70–110)
GLUCOSE SERPL-MCNC: 161 MG/DL (ref 74–99)
HBA1C MFR BLD: 6.2 % (ref 4.2–5.6)
HCT VFR BLD AUTO: 35.3 % (ref 36–48)
HGB BLD-MCNC: 12 G/DL (ref 13–16)
INR PPP: 1.1 (ref 0.8–1.2)
MCH RBC QN AUTO: 31.4 PG (ref 24–34)
MCHC RBC AUTO-ENTMCNC: 34 G/DL (ref 31–37)
MCV RBC AUTO: 92.4 FL (ref 74–97)
PLATELET # BLD AUTO: 208 K/UL (ref 135–420)
PMV BLD AUTO: 9.4 FL (ref 9.2–11.8)
POTASSIUM SERPL-SCNC: 3.8 MMOL/L (ref 3.5–5.5)
PROTHROMBIN TIME: 14.4 SEC (ref 11.5–15.2)
RBC # BLD AUTO: 3.82 M/UL (ref 4.7–5.5)
SODIUM SERPL-SCNC: 140 MMOL/L (ref 136–145)
WBC # BLD AUTO: 6.7 K/UL (ref 4.6–13.2)

## 2019-07-18 PROCEDURE — 85027 COMPLETE CBC AUTOMATED: CPT

## 2019-07-18 PROCEDURE — 74011250636 HC RX REV CODE- 250/636: Performed by: ORTHOPAEDIC SURGERY

## 2019-07-18 PROCEDURE — 83036 HEMOGLOBIN GLYCOSYLATED A1C: CPT

## 2019-07-18 PROCEDURE — 74011250637 HC RX REV CODE- 250/637: Performed by: PHYSICIAN ASSISTANT

## 2019-07-18 PROCEDURE — 82962 GLUCOSE BLOOD TEST: CPT

## 2019-07-18 PROCEDURE — 36415 COLL VENOUS BLD VENIPUNCTURE: CPT

## 2019-07-18 PROCEDURE — 85610 PROTHROMBIN TIME: CPT

## 2019-07-18 PROCEDURE — 97116 GAIT TRAINING THERAPY: CPT

## 2019-07-18 PROCEDURE — 80048 BASIC METABOLIC PNL TOTAL CA: CPT

## 2019-07-18 PROCEDURE — 97110 THERAPEUTIC EXERCISES: CPT

## 2019-07-18 PROCEDURE — 74011250636 HC RX REV CODE- 250/636: Performed by: PHYSICIAN ASSISTANT

## 2019-07-18 RX ORDER — OXYCODONE AND ACETAMINOPHEN 5; 325 MG/1; MG/1
1-2 TABLET ORAL
Qty: 30 TAB | Refills: 0 | Status: SHIPPED | OUTPATIENT
Start: 2019-07-18 | End: 2019-07-21

## 2019-07-18 RX ORDER — MAGNESIUM SULFATE 100 %
4 CRYSTALS MISCELLANEOUS AS NEEDED
Status: DISCONTINUED | OUTPATIENT
Start: 2019-07-18 | End: 2019-07-18 | Stop reason: HOSPADM

## 2019-07-18 RX ORDER — NALOXONE HYDROCHLORIDE 4 MG/.1ML
SPRAY NASAL
Qty: 3 EACH | Refills: 0 | Status: SHIPPED | OUTPATIENT
Start: 2019-07-18

## 2019-07-18 RX ORDER — INSULIN LISPRO 100 [IU]/ML
INJECTION, SOLUTION INTRAVENOUS; SUBCUTANEOUS
Status: DISCONTINUED | OUTPATIENT
Start: 2019-07-18 | End: 2019-07-18 | Stop reason: HOSPADM

## 2019-07-18 RX ORDER — ENOXAPARIN SODIUM 100 MG/ML
40 INJECTION SUBCUTANEOUS DAILY
Qty: 2 ML | Refills: 0 | Status: SHIPPED | OUTPATIENT
Start: 2019-07-19 | End: 2019-07-24

## 2019-07-18 RX ADMIN — LISINOPRIL 10 MG: 5 TABLET ORAL at 10:39

## 2019-07-18 RX ADMIN — Medication 8.6 MG: at 10:39

## 2019-07-18 RX ADMIN — HYDROCHLOROTHIAZIDE 25 MG: 25 TABLET ORAL at 10:39

## 2019-07-18 RX ADMIN — MELOXICAM 15 MG: 7.5 TABLET ORAL at 10:38

## 2019-07-18 RX ADMIN — ENOXAPARIN SODIUM 40 MG: 40 INJECTION SUBCUTANEOUS at 00:09

## 2019-07-18 RX ADMIN — SODIUM CHLORIDE, SODIUM LACTATE, POTASSIUM CHLORIDE, AND CALCIUM CHLORIDE 125 ML/HR: 600; 310; 30; 20 INJECTION, SOLUTION INTRAVENOUS at 00:09

## 2019-07-18 RX ADMIN — OMEPRAZOLE 20 MG: 20 CAPSULE, DELAYED RELEASE ORAL at 10:39

## 2019-07-18 RX ADMIN — OXYCODONE HYDROCHLORIDE AND ACETAMINOPHEN 1 TABLET: 5; 325 TABLET ORAL at 10:39

## 2019-07-18 RX ADMIN — Medication 2 G: at 01:04

## 2019-07-18 RX ADMIN — OXYCODONE HYDROCHLORIDE AND ACETAMINOPHEN 1 TABLET: 5; 325 TABLET ORAL at 06:54

## 2019-07-18 NOTE — ROUTINE PROCESS
Bedside and Written shift change report given to YURIY Henry RN (oncoming nurse) by ДМИТРИЙ Charlton RN (offgoing nurse). Report included the following information SBAR, Kardex, Intake/Output, MAR and Recent Results.

## 2019-07-18 NOTE — OP NOTES
TOTAL KNEE ARTHROPLASTY OP NOTE      OPERATIVE REPORT    Patient: Antonette Watkins CSN: [id number] SSN: [id number]    YOB: 1940  Age: [de-identified] y.o. Sex: male      Admit Date: 7/17/2019  Admit Diagnosis: Osteoarthritis of right knee [M17.11]    Date of Procedure: 7/17/2019   Preoperative Diagnosis: UNILATERAL PRIMARY OSTEOARTHRITIS, RIGHT KNEE  Postoperative Diagnosis: UNILATERAL PRIMARY OSTEOARTHRITIS, RIGHT KNEE    Procedure: Procedure(s):  RIGHT TOTAL KNEE ARTHROPLASTY,  Surgeon: Mallika Alvarez MD  Assistant(s):  SHELLY Julian  Anesthesia: General   Estimated Blood Loss:<50CC  Specimens: * No specimens in log *   Complications: None  Implants:   Implant Name Type Inv. Item Serial No.  Lot No. LRB No. Used Action   PAT ASYM MTL-BK 11MM SZ A40 -- TRIATHLON - [id number]  PAT ASYM MTL-BK 11MM SZ A40 -- TRIATHLON  [employer] HOW [id number] Right 1 Implanted   COMPNT FEM CR TRIATHLN 6 R PA --  - [id number]  COMPNT FEM CR TRIATHLN 6 R PA --   [employer] HOWM [id number] Right 1 Implanted   TIBIAL BEARING INSERT - CR    [employer] [id number] Right 1 Implanted   BASEPLT TIB PC TRITNM SZ 7 -- TRIATHLON - [id number]  BASEPLT TIB PC TRITNM SZ 7 -- TRIATHLON  [employer] HOW [id number] Right 1 Implanted         INDICATIONS: The patient is a [de-identified] y.o., male who has who has been suffering from knee arthritis. He has failed conservative therapy including activity modification, anti-inflammatories and injections. The arthritis is significantly impacting the patient's quality of life. We did discuss the option of total knee arthroplasty with them at length. He understood the risks and benefits including the risks of infection and blood clot, and elected to proceed with knee replacement.     DESCRIPTION OF PROCEDURE: After being informed of the risks and benefits, which include, but are not limited to, bleeding, infection, neurovascular damage, wound complications, pain and stiffness in the knee, periprosthetic loosening, fracture dislocation and DVT, the patient consented for the procedure. The patient was brought to the operating suite and properly identified. He was placed supine upon the operating table and placed under a general anesthetic. A regional block was placed in preoperative holding. Once an adequate level of anesthesia was obtained, a tourniquet was placed high on the operative thigh. The leg was prepped and draped in the usual sterile fashion. The leg was exsanguinated with an Esmarch. The tourniquet was inflated to 280 mmHg. Tourniquet time was approximately 1 hour. A longitudinal incision was made centered over the patella. Dissection was carried down through the subcutaneous tissue. The underlying capsule was identified and a medial parapatellar arthrotomy was made in standard fashion. The joint was exposed and evaluated. There were severe arthritic changes noted. The patella was everted. Approximately 1 cm of the articular surface was removed in a freehand fashion. Once that was completed, the knee was flexed. The distal intramedullary guide was placed and a 5-degree, 8-mm cut was made in standard fashion. The distal femur was sized, the 4-in-1 cutting block was placed in line with the posterior condyle and epicondylar access, and the anterior, posterior, and chamfer cuts were made in standard fashion. Attention was next turned to the tibia. Using the extramedullary tibial guide parallel to the axis of the tibia, we resected 9 mm below the higher plateau. The remaining plateau was sized. All osteophytes were removed. The remaining medial and lateral meniscus was excised. The trial implants were then placed with a trial insert. The knee had full flexion, full extension and good stability throughout. The PCL was noted to be intact. The patella was sized to the appropriate button. The keel holes were drilled, and the poly was placed.  The patella tracked nicely throughout full range of motion. The trial implants were then removed. The bone ends were irrigated and dried. The final implants were then pressed into place beginning with the tibia, followed by the femur, and lastly the patella. The polyethylene spacer was locked into the tibial tray. The tourniquet was deflated. Bleeding was controlled with electrocautery. Bleeding was not felt to be excessive enough to warrant a drain. The capsule was closed with a #1 Vicryl in a figure-of-eight interrupted fashion. Subcutaneous tissue was closed with 2-0 Vicryl. The skin was closed with 3-0 Monocryl in a running subcuticular fashion. Steri-strips and a sterile compressive dressing was applied. The patient was awakened and transferred to the recovery in stable condition. All needle and sponge counts were reported as correct at the end of the case.

## 2019-07-18 NOTE — PROGRESS NOTES
Transition of Care (LENI) Plan:     Chart reviewed, met with pt in room. Pt planning discharge home, spouse and daughters available to assist. [address] offered, pt chose [facility]  for follow up; referral placed with CMS. Pt has SW for home, will purchase either wheels for current walker or buy RW. Pt aware that [employer], [employer] drug stores, "[employer] stores have RW for sale. LENI Transportation:   How is patient being transported at discharge? Family/Friend      When? Once cleared by Therapy between 12-2pm     Is transport scheduled? N/A      Follow-up appointment and transportation:   PCP/Specialist?  See AVS for Appointment         Who is transporting to the follow-up appointment? Family/Friend      Is transport for follow up appointment scheduled? N/A    Communication plan (with patient/family): Who is being called? Patient or Next of Kin? Responsible party? Patient      What number(s) is to be used? See Facesheet      What service provider is calling for [facility] services? When are they calling? 24-48 hours following discharge    Readmission Risk? (Green/Low; Yellow/Moderate; Red/High):  Green    Care Management Interventions  PCP Verified by CM:  Yes  Transition of Care Consult (CM Consult): [address]: No  Reason Outside [place]: Physician referred to specific agency, Out of service area([facility])  Discharge Durable Medical Equipment: No  Physical Therapy Consult: Yes  Occupational Therapy Consult: Yes  Current Support Network: Lives with Spouse, Family Lives Nearby  Confirm Follow Up Transport: Family  Plan discussed with Pt/Family/Caregiver: Yes  Freedom of Choice Offered: Yes  Discharge Location  Discharge Placement: Home with home health

## 2019-07-18 NOTE — PROGRESS NOTES
2591 - Bedside shift report received from QUE Marques. Assumed care of patient. Patient noted resting in bed at this time. Call light in reach. 1040 - Assessment complete. Patient alert and oriented x 4. Cap refills < 3 sec. Pedal pulses palpable. Lung sounds clear bilaterally. Respirations even and unlabored. Abd soft and nontender. Patient voiding without difficulty. Skin warm and dry. Ace wrap noted CDI to right knee and removed as per orders. Mepilex noted CDI. IV to right FA, site CDI. Denies numbness/tingling to BLE. Andre hose to BLE. Plexi's to bilateral feet. Reports pain 3/10. PRN percocet administered for 3/10 pain and pain during discharge. Patient sitting on edge of bed with call light in reach. SUBJECTIVE:                                                      Radha Royal is a 6 month old female, here for a routine health maintenance visit.    Patient was roomed by: Jennifer R. Reyes Gomez    Well Child     Social History  Patient accompanied by:  Mother  Questions or concerns?: No    Forms to complete? No  Child lives with::  Mother, father and brother  Who takes care of your child?:  Home with family member and   Languages spoken in the home:  English and OTHER*  Recent family changes/ special stressors?:  None noted    Safety / Health Risk  Is your child around anyone who smokes?  No    TB Exposure:     No TB exposure    Car seat < 6 years old, in  back seat, rear-facing, 5-point restraint? Yes    Home Safety Survey:      Stairs Gated?:  NO     Wood stove / Fireplace screened?  Yes     Poisons / cleaning supplies out of reach?:  Yes     Swimming pool?:  No     Firearms in the home?: No      Hearing / Vision  Hearing or vision concerns?  No concerns, hearing and vision subjectively normal    Daily Activities    Water source:  Filtered water  Nutrition:  Pumped breastmilk by bottle, formula and pureed foods  Formula:  [place]  Vitamins & Supplements:  No    Elimination       Urinary frequency:4-6 times per 24 hours     Stool frequency: 1-3 times per 24 hours     Stool consistency: soft     Elimination problems:  None    Sleep      Sleep arrangement:crib    Sleep position:  On back    Sleep pattern: wakes at night for feedings    ============================  DEVELOPMENT  Milestones (by observation/ exam/ report. 75-90% ile):      PERSONAL/ SOCIAL/COGNITIVE:    Turns from strangers    Reaches for familiar people    Looks for objects when out of sight  LANGUAGE:    Laughs/ Squeals    Turns to voice/ name    Babbles  GROSS MOTOR:    Has started rolling retirement (back to belly), but not all the way.    Pull to sit-no head lag    Sit with support  FINE MOTOR/ ADAPTIVE:    Puts objects in mouth     "Raking grasp    Transfers hand to hand      PROBLEM LIST  Patient Active Problem List   Diagnosis      infant 36 wk     Family history of autism in sibling     Positional plagiocephaly     MEDICATIONS  Current Outpatient Prescriptions   Medication Sig Dispense Refill     amoxicillin (AMOXIL) 250 MG/5ML suspension Take 4.4 mLs (220 mg) by mouth 2 times daily (Patient not taking: Reported on 3/1/2018) 70 mL 0     hydrocortisone (WESTCORT) 0.2 % cream Apply sparingly to affected area twice daily for 5 days, then once daily as needed for flare-ups. (Patient not taking: Reported on 3/1/2018) 45 g 1      ALLERGY  No Known Allergies    IMMUNIZATIONS  Immunization History   Administered Date(s) Administered     DTAP-IPV/HIB (PENTACEL) 2017, 2017     Hep B, Peds or Adolescent 2017     HepB 2017     Pneumo Conj 13-V (2010&after) 2017, 2017     Rotavirus, monovalent, 2-dose 2017, 2017       HEALTH HISTORY SINCE LAST VISIT  No surgery, major illness or injury since last physical exam    ROS  GENERAL: See health history, nutrition and daily activities   SKIN: No significant rash or lesions.  HEENT: Hearing/vision: see above.  No eye, nasal, ear symptoms.  RESP: No cough or other concens  CV:  No concerns  GI: See nutrition and elimination.  No concerns.  : See elimination. No concerns.  NEURO: See development    This document serves as a record of the services and decisions personally performed and made by Katherine Regalado MD. It was created on her behalf by Laura Willis, a trained medical scribe. The creation of this document is based the provider's statements to the medical scribe.    Laura Willis 2018 1:53 PM    OBJECTIVE:   EXAM  Temp 99.2  F (37.3  C) (Rectal)  Ht 2' 1\" (0.635 m)  Wt 12 lb 8 oz (5.67 kg)  HC 15.98\" (40.6 cm)  BMI 14.06 kg/m2  13 %ile based on WHO (Girls, 0-2 years) length-for-age data using vitals from 3/1/2018.  1 %ile based on WHO (Girls, " 0-2 years) weight-for-age data using vitals from 3/1/2018.  9 %ile based on WHO (Girls, 0-2 years) head circumference-for-age data using vitals from 3/1/2018.  GENERAL: Active, alert, in no acute distress.  SKIN: No significant rash, abnormal pigmentation or lesions. Mild atopic dermatitis around wrists bilaterally.    HEAD: Normocephalic. Normal fontanels and sutures.  EYES: Conjunctivae and cornea normal. Red reflexes present bilaterally.  EARS: Normal canals. Tympanic membranes are normal; gray and translucent.  NOSE: Normal without discharge.  MOUTH/THROAT: Clear. No oral lesions.  NECK: Supple, no masses.  LYMPH NODES: No adenopathy  LUNGS: Clear. No rales, rhonchi, wheezing or retractions  HEART: Regular rhythm. Normal S1/S2. No murmurs. Normal femoral pulses.  ABDOMEN: Soft, non-tender, not distended, no masses or hepatosplenomegaly. Normal umbilicus and bowel sounds.   GENITALIA: Normal male external genitalia. Real stage I,  Testes descended bilateraly, no hernia or hydrocele.    EXTREMITIES: Hips normal with negative Ortolani and Matute. Symmetric creases and  no deformities  NEUROLOGIC: Normal tone throughout. Normal reflexes for age    ASSESSMENT/PLAN:     1. Encounter for routine child health examination w/o abnormal findings          Anticipatory Guidance  Reviewed Anticipatory Guidance in patient instructions    Preventive Care Plan   Immunizations     I provided face to face vaccine counseling, answered questions, and explained the benefits and risks of the vaccine components ordered today including:  [id number] (Pentacel ), Hep B - Pediatric, Influenza - Preserve Free 6-35 months and Pneumococcal 13-valent Conjugate (Prevnar )    See orders in [facility].  I reviewed the signs and symptoms of adverse effects and when to seek medical care if they should arise.  Referrals/Ongoing Specialty care: No   See other orders in [facility]  Dental visit recommended: No  Dental varnish not indicated, no  teeth    FOLLOW-UP:    9 month Preventive Care visit    The information in this document, created by the medical scribe for me, accurately reflects the services I personally performed and the decisions made by me. I have reviewed and approved this document for accuracy prior to leaving the patient care area.    Katherine Regalado MD  [facility]

## 2019-07-18 NOTE — PROGRESS NOTES
1959 -  Head to toe assessment performed at this time. Pt denies any chest pain or SOB. Pt denies any numbness or tingling to extremities. Pt encouraged to manage pain and to use the incentive spirometer. Pt educated on the side effects of medications taken. Pt left with call light within reach and bed in low position. Will continue to monitor. 2200 - Pt in bed with no signs of distress. Bed in low position and call light within reach. Will continue to monitor. 0030 - Pt in bed sleeping with no signs of distress. Bed in low position and call light within reach. Will continue to monitor. Shift summary - Pt pain managed with prn medication per MAR. Pt informed about all medications and side effects and encouraged to ask questions about medication. Pt encouraged throughout the night to use incentive spirometer and the purpose of the incentive spirometer. Pt left with no signs of distress and any concerns of pt addressed.

## 2019-07-18 NOTE — DISCHARGE SUMMARY
Patient: Roberto Peguero               Sex: male         MRN: [id number]       YOB: 1940      Age:  [de-identified] y.o.        LOS:  LOS: 1 day                DOA: 7/17/2019    Discharge Date: 7/18/2019    Admission Diagnoses: Osteoarthritis of right knee [M17.11]    Discharge Diagnoses:    Problem List as of 7/18/2019 Date Reviewed: 7/17/2019          Codes Class Noted - Resolved    Osteoarthritis of right knee ICD-10-CM: M17.11  ICD-9-CM: 715.96  7/17/2019 - Present              This is a [de-identified] y.o. male with a  history of ongoing knee pain secondary to degenerative joint disease. The patient has failed to respond to conservative care. The option of a total knee arthroplasty was discussed with the patient. Risks and benefits of the procedure were explained to the patient as well as other treatment options and possible surgical outcomes. The patient acknowledged and consent was obtained. The patient was therefore scheduled to undergo a right total knee arthroplasty with Dr. Mikayla Ga. The patient was taken to the operating room for the above-stated procedure. IV antibiotics were given prior to the incision. SCDs were used for DVT prophylaxis. The patient had an estimated intraoperative blood loss of less than 50  mL. The patient tolerated the procedure well without any complications, and was taken to the recovery room in stable condition. The patient was then transferred to the postoperative orthopedic floor for convalescence, PT, pain management, as well as discharge planning. Physical therapy and occupational therapy initiated their evaluation and treatment and continued to follow the patient until the patient was discharged. For pain control, a femoral nerve block was used, and was discontinued on post-operative day 2. The patient then was transitioned over to oral narcotics in which was well tolerated. DVT prophylaxis was initiated on the day of surgery including;   Lovenox bridge with coumadin, compression stockings and bilateral foot pumps. At the time of discharge, the patient was able to ambulate safely, go up and down stairs and had an understanding of the explicit discharge precautions and instructions following surgery. Home Health will come out to assist the pateint with this,. The patient was discharged to follow up with Dr. Felecia Paul in approximately 10 to 14 days. Discharge Condition: Stable  DISPOSITION: Home. On the day of discharge the patient was afebrile. Vital signs were stable. The patient was in no acute distress. his Right knee incision was clean, dry, and intact. Extremity was warm and well-perfused, distally neurovascularly intact. DISCHARGE INSTRUCTIONS:  The patient will be discharged home on Lovenox bridge until therapeutic on coumadin with an INR 2-3. Continue physical therapy for range of motion, gait training and strengthening. Continue therapeutic exercises. Follow up in 10 to 14 days with Dr. Felecia Paul. DISCHARGE MEDICATIONS:   Current Discharge Medication List      START taking these medications    Details   enoxaparin (LOVENOX) 40 mg/0.4 mL 0.4 mL by SubCUTAneous route daily for 5 days. Use until therapeutic on Coumadin with INR 2-3  Qty: 2 mL, Refills: 0      oxyCODONE-acetaminophen (PERCOCET) 5-325 mg per tablet Take 1-2 Tabs by mouth every four (4) hours as needed for Pain for up to 3 days. Max Daily Amount: 12 Tabs. Qty: 30 Tab, Refills: 0    Associated Diagnoses: Primary osteoarthritis of right knee      naloxone (NARCAN) 4 mg/actuation nasal spray Use 1 spray intranasally, then discard. Repeat with new spray every 2 min as needed for opioid overdose symptoms, alternating nostrils. Qty: 3 Each, Refills: 0         CONTINUE these medications which have NOT CHANGED    Details   lisinopril (PRINIVIL, ZESTRIL) 10 mg tablet Take 10 mg by mouth. hydroCHLOROthiazide (HYDRODIURIL) 25 mg tablet Take 25 mg by mouth.       omeprazole (PRILOSEC) 20 mg capsule Take 20 mg by mouth. !! warfarin (COUMADIN) 5 mg tablet Take 7.5 mg by mouth every Monday. fish oil-omega-3 fatty acids 340-1,000 mg capsule Take 1 Cap by mouth daily. !! warfarin (COUMADIN) 5 mg tablet Take 5 mg by mouth six (6) days a week. !! - Potential duplicate medications found. Please discuss with provider.       STOP taking these medications       acetaminophen (TYLENOL ARTHRITIS PAIN) 650 mg TbER Comments:   Reason for Stopping:

## 2019-07-18 NOTE — PROGRESS NOTES
Problem: Mobility Impaired (Adult and Pediatric)  Goal: *Acute Goals and Plan of Care (Insert Text)  Description  In 1-7 days pt will be able to perform:  ST.  Bed mobility:  Rolling L to R to L modified independent for positioning. 2.  Supine to sit to supine S with HR for meals. 3.  Sit to stand to sit S with RW in prep for ambulation. LT.  Gait:  Ambulate >150ft S with RW, WBAT, for home/community mobility. 2.  Stair Negotiation:  Ascend/descend >3 steps CGA with HR for home entry. 3.  Activity tolerance: Tolerate up in chair 1-2 hours for ADLs. 4.  Patient/Family Education:  Patient/family to be independent with HEP for follow-up care and safe discharge. Outcome: Resolved/Met     PHYSICAL THERAPY TREATMENT/DISCHARGE    Patient: Libra García (40 y.o. male)  Date: 2019  Diagnosis: Osteoarthritis of right knee [M17.11] <principal problem not specified>  Procedure(s) (LRB):  RIGHT TOTAL KNEE ARTHROPLASTY, (Right) 1 Day Post-Op  Precautions: Fall, WBAT  Chart, physical therapy assessment, plan of care and goals were reviewed. ASSESSMENT:  Pt meets needs for safe home mobility, expresses understanding of HEP and is cleared from this level of PT. Progression toward goals:  ?      Goals met  ? Improving appropriately and progressing toward goals  ? Improving slowly and progressing toward goals  ? Not making progress toward goals and plan of care will be adjusted     PLAN:  Patient will be discharged from physical therapy at this time. Rationale for discharge:  ? Goals Achieved  ? Plateau Reached  ? Patient not participating in therapy  ? Other:  Discharge Recommendations:  Home Health  Further Equipment Recommendations for Discharge:  rolling walker (Wheels)     SUBJECTIVE:   Patient stated I need to use my bucket.  (urinal)    OBJECTIVE DATA SUMMARY:   Critical Behavior:  Neurologic State: Alert, Appropriate for age  Orientation Level: Appropriate for age, Oriented X4  Cognition: Appropriate decision making, Appropriate for age attention/concentration, Appropriate safety awareness  Safety/Judgement: Awareness of environment  Functional Mobility Training:  Bed Mobility:  Rolling: Supervision  Supine to Sit: Supervision  Sit to Supine: Supervision  Scooting: Supervision  Transfers:  Sit to Stand: Contact guard assistance  Stand to Sit: Contact guard assistance  Balance:  Sitting: Intact  Standing: Intact; With support  Ambulation/Gait Training:  Distance (ft): 350 Feet (ft)  Assistive Device: Walker, rolling;Gait belt  Ambulation - Level of Assistance: Supervision  Gait Abnormalities: Antalgic;Decreased step clearance; Step to gait  Right Side Weight Bearing: As tolerated  Base of Support: Shift to left  Stance: Right decreased  Speed/Rach: Slow  Step Length: Left shortened;Right shortened  Swing Pattern: Left symmetrical;Right symmetrical  Interventions: Safety awareness training; Tactile cues; Verbal cues; Visual/Demos  Stairs:  Number of Stairs Trained: 12  Stairs - Level of Assistance: Supervision   Rail Use: Both  Therapeutic Exercises:   HEP in full x 15 min   AAROM= 5-85 degrees  Pain:  Pain Scale 1: Numeric (0 - 10)  Pain Intensity 1: 2  Pain Location 1: Knee  Pain Orientation 1: Right  Pain Description 1: Aching; Sore  Pain Intervention(s) 1: Medication (see MAR)  Activity Tolerance:   Good  Please refer to the flowsheet for vital signs taken during this treatment. After treatment:   ? Patient left in no apparent distress sitting up in chair  ? Patient left in no apparent distress in bed  ? Call bell left within reach  ? Nursing notified  ? Caregiver present  ?  Bed alarm activated  Valentin Palafox PTA   Time Calculation: 30 mins

## 2019-07-18 NOTE — PROGRESS NOTES
Progress Note        Patient: Stu Marin MRN: [id number]  SSN: [id number]    YOB: 1940  Age: [de-identified] y.o. Sex: male      1 Day Post-Op status post Procedure(s) (LRB):  RIGHT TOTAL KNEE ARTHROPLASTY, (Right)    Admit Date: 2019  Admit Diagnosis: Osteoarthritis of right knee [M17.11]    Subjective:      Doing well. No complaints. No SOB. No Chest Pain. No Nausea or Vomiting. No problems eating or voiding. Objective:        Temp (24hrs), Av.7 °F (36.5 °C), Min:97.4 °F (36.3 °C), Max:98.2 °F (36.8 °C)    Body mass index is 29.12 kg/m². Patient Vitals for the past 12 hrs:   BP Temp Pulse Resp SpO2   19 0356 132/59 98.2 °F (36.8 °C) 62 16 97 %   19 2313 153/82 97.4 °F (36.3 °C) 64 16 98 %   19 1952 152/90 97.5 °F (36.4 °C) 68 16 97 %     Recent Labs     19  0310   HGB 12.0*   HCT 35.3*   INR 1.1      K 3.8      CO2 25   BUN 23*   CREA 0.93   *       Physical Exam:  Vital Signs are Stable. No Acute Distress. Alert and Oriented. Negative Homans sign. Toes AROM Full. Neurovascular exam is normal.    Dressing is Clean, Dry, and Intact. Assessment/Plan:     1. Continue oob/rehab  2. Dressing change today  3. Continue PT/OT  4. Continue ROM  5. Lovenox bridge until therapeutic on coumadin  6. Elevated blood glucose, will cover with sliding scale while in house  7.  Discharge planning    Discharge Plan: Home    Signed By: Wanda Acosta PA-C     2019

## 2019-07-18 NOTE — DISCHARGE INSTRUCTIONS
Total Knee Arthroplasty Discharge Instructions   Homa Steiner M.D. Please take the time to review the following instructions before you leave the hospital and use them as guidelines during your recovery from surgery. If you have any questions you may contact my office at [phone]. Wound Care / Dressing Changes: You may change your dressing as needed. Beginning the day after you are discharged from the hospital you should change your dressing daily. A big, bulky dressing isn't necessary as long as there isn't any drainage from the incisions. You can put a band-aid or mepilex dressing over the incision. It isn't necessary to apply antibiotic ointment to your incisions. There will be a clear film covering your incision, do not remove. As the edges begin to peel, you may trim with small scissors. This film will fall of in about 1 month. Garald Ore / Bathing: You may only shower. You may shower if there is no drainage from your incisions. Your dressing may be removed for showering. You may get your incisions wet in the shower. Don't vigorously scrub the area where your incisions are. Apply a clean, dry dressing after drying off the area of your incisions. Don't take a tub bath, get in a swimming pool or Jacuzzi until the incisions are completely healed. Do not soak your incisions under water. Weight Bearing Status / Braces:     _X____ Weight bearing as tolerated. Use crutches, walker, or cane as needed for support. You may move your joints as much as tolerated.     _____  Venkatesh Son Touch\" weight bearing. Don't bear weight on the leg you had operated on. Use your toes only to steady yourself as you ambulate with crutches or a walker. _____ Avoid extreme hip extension with external rotation. Home Health:    Home health has been arranged for skilled nursing visits and physical therapy. Your home health has been set up through____________ .  If no one from the agency calls you on the day after you arrive home, please contact them at the number provided at discharge. Physical therapy for gait training and strengthening. Continue therapeutic exercises. Physical Therapy:       Begin In-Home Physical Therapy 3 times a week to work on gait training, range of motion, strengthening, and weight bearing exercises as tolerable. Continue to use your walker or cane when walking. You may progress from the walker to a cane to full weight bearing as tolerable. Ice / Elevation:     Continue ice and elevation as needed for pain and swelling. Diet:     Resume your pre hospital diet. If you have excessive nausea or vomiting call your doctor's office. Medication:     1. You will be given a prescription for pain medication when you are discharged for the hospital. Take the medication as needed according to the directions on the prescription bottle. Possible side effects of the medication include dizziness, headache, nausea, vomiting, constipation and urinary retention. If you experience any of these side effects call the office so that we can assist you in relieving them. Discontinue the use of the pain medication if you develop itching, rash, shortness of breath or difficulties swallowing. If these symptoms become severe or aren't relieved by discontinuing the medication you should seek immediate medical attention. Refills of pain medication are authorized during office hours only. (8am - 5pm Mon. thru Fri.)     1. You should take Lovenox 40 mg daily with coumadin until your INR become therapeutic with an INR 2-3, then your coumadin dose will need to be adjusted to maintain an INR 2-3 . This will help to prevent blood clots from forming in your legs. 2. You may resume the medication you were taking prior to your surgery. Pain medication may change the effects of any antidepressant medication you are taking.  If you have any questions about possible interactions between your regular medications and the pain medication you should consult the physician who prescribes your regular medications. 3.   Please take over the counter stool softeners while you are taking narcotic pain                  medication. Pain medications can cause constipation. Stool softeners, warm         prune juice and increasing your water and fiber intake can help prevent constipation. Do not take laxatives. Follow Up Appointment:   Please call [phone] for a follow appointment with Dr. Kedar Palma in 10-14 days from the time of your surgery. Please let our office know you are scheduling a post-op appointment. Signs and Symptoms to be Aware of: If any of the following signs and symptoms occur, you should contact Dr. Bibiana Turner office. Please be advised if a problem arises which you feel requires immediate medical attention or you are unable to contact Dr. Bibiana Turner office you should seek immediate medical attention at the emergency department or other health care facility you have access to. Signs and symptoms to watch for include:     1. A sudden increase in swelling and or redness or warmth at the area your surgery was performed which isn't relieved by rest, ice and elevation. 2 Oral temperature greater than 101.5 degrees for 12 hours or more which isn't relieved by an increase in fluid intake and taking two Tylenol every 4-6 hours. 3 Excessive drainage from your incisions, or drainage which hasn't stopped by 72 hours after your surgery despite applying a compressive dressing, ice and elevation. 4 Calfpain, tenderness, redness or swelling which isn't relieved with rest and elevation. 5 Fever, chills, shortness ofbreath, chest pain, nausea, vomiting or other signs and symptoms which are of concern to you.      Other Instructions:

## 2019-07-18 NOTE — ROUTINE PROCESS
Dual AVS reviewed with Valentino Dove, RN. All medications reviewed individually with patient. Opportunities for questions and concerns provided. Patient verbalized understanding and verified by [employer]back. IV discontinued, no redness, swelling or pain noted. Patient discharged via (mode of transport ie. Car, ambulance or air transport) car. Patient's arm band appropriately discarded.

## 2019-08-25 ENCOUNTER — HOSPITAL ENCOUNTER (EMERGENCY)
Age: 79
Discharge: HOME | End: 2019-08-25
Attending: EMERGENCY MEDICINE

## 2019-08-25 VITALS
OXYGEN SATURATION: 99 % | TEMPERATURE: 98.5 F | DIASTOLIC BLOOD PRESSURE: 74 MMHG | SYSTOLIC BLOOD PRESSURE: 149 MMHG | HEIGHT: 66 IN | BODY MASS INDEX: 27.97 KG/M2 | HEART RATE: 90 BPM | RESPIRATION RATE: 16 BRPM | WEIGHT: 174 LBS

## 2019-08-25 DIAGNOSIS — B37.2 CANDIDAL INTERTRIGO: Primary | ICD-10-CM

## 2019-08-25 RX ORDER — OMEPRAZOLE 20 MG/1
20 CAPSULE, DELAYED RELEASE ORAL AS NEEDED
COMMUNITY

## 2019-08-25 RX ORDER — NYSTATIN 100000 [USP'U]/G
POWDER TOPICAL
Qty: 60 G | Refills: 0 | Status: SHIPPED | OUTPATIENT
Start: 2019-08-25

## 2019-08-25 RX ORDER — NYSTATIN AND TRIAMCINOLONE ACETONIDE 100000; 1 [USP'U]/G; MG/G
CREAM TOPICAL 2 TIMES DAILY
Qty: 30 G | Refills: 1 | Status: SHIPPED | OUTPATIENT
Start: 2019-08-25

## 2019-08-25 NOTE — DISCHARGE INSTRUCTIONS
Try to keep the area clean and as dry as possible  Allow area to air out when at home. Avoid wearing underwear until healed up  Medication as prescribed. Use medicated cream during the day, medicated powder at night  Follow-up with PCP     Yeast Skin Infection: Care Instructions  Your Care Instructions    Yeast normally lives on your skin. Sometimes too much yeast can overgrow in certain areas of the skin and cause an infection. The infection causes red, scaly, moist patches on your skin that may itch. Common areas for skin yeast infections are skin folds under the breasts or belly area. The warm and moist areas in the skin folds can make it easier for yeast to overgrow. Yeast infections also can be found on other parts of the body such as the groin or armpits. You will probably get a cream or ointment that contains an antifungal medicine. Examples of these are miconazole and clotrimazole. You put it on your skin to treat the infection. Your doctor may give you a prescription for the cream or ointment. Or you may be able to buy it without a prescription at most drugstores. If the infection is severe, the doctor will prescribe antifungal pills. A yeast infection usually goes away after about a week of treatment. But it's important to use the medicine for as long as your doctor tells you to. Follow-up care is a key part of your treatment and safety. Be sure to make and go to all appointments, and call your doctor if you are having problems. It's also a good idea to know your test results and keep a list of the medicines you take. How can you care for yourself at home? · Be safe with medicines. Take your medicines exactly as prescribed. Call your doctor if you think you are having a problem with your medicine. · Keep your skin clean and dry. Your doctor may suggest using powder that contains an antifungal medicine in the skin folds. · Wear loose clothing. When should you call for help?   Call your doctor now or seek immediate medical care if:    · You have symptoms of infection, such as:  ? Increased pain, swelling, warmth, or redness. ? Red streaks leading from the area. ? Pus draining from the area. ? A fever.    Watch closely for changes in your health, and be sure to contact your doctor if:    · You do not get better as expected. Where can you learn more? Go to http://leny-matthew.info/. Enter V875 in the search box to learn more about \"Yeast Skin Infection: Care Instructions. \"  Current as of: April 1, 2019  Content Version: 12.1  © 7664-2216 [employer]. Care instructions adapted under license by [employer] (which disclaims liability or warranty for this information). If you have questions about a medical condition or this instruction, always ask your healthcare professional. [address] any warranty or liability for your use of this information.

## 2019-08-25 NOTE — ED PROVIDER NOTES
EMERGENCY DEPARTMENT HISTORY AND PHYSICAL EXAM    Date: 8/25/2019  Patient Name: Angela Ríos    History of Presenting Illness     Time Seen: 12:03 PM    Chief Complaint   Patient presents with    Rash       History Provided By: Patient    Additional History (Context): Angela Ríos is a [de-identified] y.o. male presents emergency room with complaints of a bad rash between his scrotum and leg on the left side. Patient states that he just noticed the rash this morning. Notes that it is painful when his close rubs up against.  Rash is isolated to that area. Denies any itching. PCP: Jonathan Lopez MD    Current Outpatient Medications   Medication Sig Dispense Refill    omeprazole (PRILOSEC) 20 mg capsule Take 20 mg by mouth as needed.  nystatin-triamcinolone (MYCOLOG II) topical cream Apply  to affected area two (2) times a day. Apply in the morning and in the afternoon. Continue application until rash has resolved as directed. 30 g 1    nystatin (MYCOSTATIN) powder Apply  to affected area nightly. Make sure to clean area and pat dry before application at night. 60 g 0    naloxone (NARCAN) 4 mg/actuation nasal spray Use 1 spray intranasally, then discard. Repeat with new spray every 2 min as needed for opioid overdose symptoms, alternating nostrils. 3 Each 0    lisinopril (PRINIVIL, ZESTRIL) 10 mg tablet Take 10 mg by mouth.  hydroCHLOROthiazide (HYDRODIURIL) 25 mg tablet Take 25 mg by mouth.  warfarin (COUMADIN) 5 mg tablet Take 7.5 mg by mouth every Monday.  fish oil-omega-3 fatty acids 340-1,000 mg capsule Take 1 Cap by mouth daily.  warfarin (COUMADIN) 5 mg tablet Take 5 mg by mouth six (6) days a week.          Past History     Past Medical History:  Past Medical History:   Diagnosis Date    Arrhythmia 2017    A-fib    Arthritis     Cancer ([facility] [address]     skin cancers    GERD (gastroesophageal reflux disease)     Hypertension     Ill-defined condition     Very sensitive skin, scaly and itchy with many types of chemicals        Past Surgical History:  Past Surgical History:   Procedure Laterality Date    HX APPENDECTOMY  35years old    HX KNEE REPLACEMENT      HX OTHER SURGICAL  multiple years    8-10 skin cancers removed in 's office    HX SHOULDER ARTHROSCOPY Right 2003    shoulder torn tendon    HX UROLOGICAL  many years ago    hydrocele       Family History:  History reviewed. No pertinent family history. Social History:  Social History     Tobacco Use    Smoking status: Never Smoker    Smokeless tobacco: Never Used   Substance Use Topics    Alcohol use: Never     Frequency: Never    Drug use: Never       Allergies: Allergies   Allergen Reactions    Penicillins Other (comments)     Severe migraine headache         Review of Systems   Review of Systems   Skin: Positive for rash. Negative for wound. All other systems reviewed and are negative. Physical Exam     Vitals:    08/25/19 1143   BP: 149/74   Pulse: 90   Resp: 16   Temp: 98.5 °F (36.9 °C)   SpO2: 99%   Weight: 78.9 kg (174 lb)   Height: 5' 6\" (1.676 m)     Physical Exam   Constitutional: He is oriented to person, place, and time. He appears well-developed and well-nourished. No distress. Cardiovascular: Normal rate and regular rhythm. Pulmonary/Chest: Effort normal and breath sounds normal.   Neurological: He is alert and oriented to person, place, and time. Skin: Skin is warm and dry. Left groin -reveals a well demarcated, erythematous, moist rash located both on the scrotum and on his leg. Minimally tender. No surrounding erythema or induration. No drainage. No odor. No evidence of similar rash on the right side. Psychiatric: He has a normal mood and affect. Nursing note and vitals reviewed. Nursing note and vitals reviewed         Diagnostic Study Results     Labs -   No results found for this or any previous visit (from the past 12 hour(s)).     Radiologic Studies   No orders to display     CT Results  (Last 48 hours)    None        CXR Results  (Last 48 hours)    None            Medical Decision Making   I am the first provider for this patient. I reviewed the vital signs, available nursing notes, past medical history, past surgical history, family history and social history. Vital Signs-Reviewed the patient's vital signs. Records Reviewed: Nursing Notes     Provider Notes:   [de-identified] y.o. male presents emergency room with complaints of rash in his left groin. On examination, consistent with candidal intertrigo. Will treat with topical cream consisting of antifungal/steroid to be used twice a day followed by antifungal powder at night until the rash has completely resolved. Patient to follow-up with his PCP. Discharge home. Procedures:  Procedures    ED Course:   Initial assessment performed. The patients presenting problems have been discussed, and they are in agreement with the care plan formulated and outlined with them. I have encouraged them to ask questions as they arise throughout their visit. Diagnosis and Disposition       DISCHARGE NOTE:  Lily Fleming's  results have been reviewed with him. He has been counseled regarding his diagnosis, treatment, and plan. He verbally conveys understanding and agreement of the signs, symptoms, diagnosis, treatment and prognosis and additionally agrees to follow up as discussed. He also agrees with the care-plan and conveys that all of his questions have been answered. I have also provided discharge instructions for him that include: educational information regarding their diagnosis and treatment, and list of reasons why they would want to return to the ED prior to their follow-up appointment, should his condition change. He has been provided with education for proper emergency department utilization. CLINICAL IMPRESSION:    1. Candidal intertrigo        PLAN:  1. D/C Home  2.    Discharge Medication List as of 8/25/2019 12:10 PM      START taking these medications    Details   nystatin-triamcinolone (MYCOLOG II) topical cream Apply  to affected area two (2) times a day. Apply in the morning and in the afternoon. Continue application until rash has resolved as directed. , Print, Disp-30 g, R-1      nystatin (MYCOSTATIN) powder Apply  to affected area nightly. Make sure to clean area and pat dry before application at night., Print, Disp-60 g, R-0         CONTINUE these medications which have NOT CHANGED    Details   omeprazole (PRILOSEC) 20 mg capsule Take 20 mg by mouth as needed., Historical Med      naloxone (NARCAN) 4 mg/actuation nasal spray Use 1 spray intranasally, then discard. Repeat with new spray every 2 min as needed for opioid overdose symptoms, alternating nostrils. , Normal, Disp-3 Each, R-0      lisinopril (PRINIVIL, ZESTRIL) 10 mg tablet Take 10 mg by mouth., Historical Med      hydroCHLOROthiazide (HYDRODIURIL) 25 mg tablet Take 25 mg by mouth., Historical Med      !! warfarin (COUMADIN) 5 mg tablet Take 7.5 mg by mouth every Monday., Historical Med      fish oil-omega-3 fatty acids 340-1,000 mg capsule Take 1 Cap by mouth daily. , Historical Med      !! warfarin (COUMADIN) 5 mg tablet Take 5 mg by mouth six (6) days a week., Historical Med       !! - Potential duplicate medications found. Please discuss with provider. 3.   Follow-up Information     Follow up With Specialties Details Why Contact Info    Brian Thao MD Family Practice In 1 week Soon[place] if worse [address] [id number]  [phone]      [facility] EMERGENCY DEPT Emergency Medicine  If symptoms worsen, As needed [address] [id number]  [phone]        ____________________________________     Please note that this dictation was completed with [employer], the [employer] voice recognition software.   Quite often unanticipated grammatical, syntax, homophones, and other interpretive errors are inadvertently transcribed by the computer software. Please disregard these errors. Please excuse any errors that have escaped final proofreading.
